# Patient Record
Sex: MALE | Race: WHITE | URBAN - METROPOLITAN AREA
[De-identification: names, ages, dates, MRNs, and addresses within clinical notes are randomized per-mention and may not be internally consistent; named-entity substitution may affect disease eponyms.]

---

## 2023-10-30 ENCOUNTER — APPOINTMENT (OUTPATIENT)
Dept: URBAN - METROPOLITAN AREA CLINIC 224 | Age: 63
Setting detail: DERMATOLOGY
End: 2023-11-02

## 2023-10-30 DIAGNOSIS — L30.9 DERMATITIS, UNSPECIFIED: ICD-10-CM

## 2023-10-30 PROCEDURE — OTHER ORDER TESTS: OTHER

## 2023-10-30 PROCEDURE — OTHER COUNSELING: OTHER

## 2023-10-30 PROCEDURE — OTHER TREATMENT REGIMEN: OTHER

## 2023-10-30 PROCEDURE — OTHER MIPS QUALITY: OTHER

## 2023-10-30 PROCEDURE — 99204 OFFICE O/P NEW MOD 45 MIN: CPT

## 2023-10-30 PROCEDURE — OTHER PRESCRIPTION: OTHER

## 2023-10-30 RX ORDER — CEPHALEXIN 500 MG/1
CAPSULE ORAL
Qty: 20 | Refills: 0 | Status: ERX | COMMUNITY
Start: 2023-10-30

## 2023-10-30 RX ORDER — TRIAMCINOLONE ACETONIDE 1 MG/G
CREAM TOPICAL
Qty: 80 | Refills: 0 | Status: ERX | COMMUNITY
Start: 2023-10-30

## 2023-10-30 ASSESSMENT — LOCATION SIMPLE DESCRIPTION DERM: LOCATION SIMPLE: LEFT PRETIBIAL REGION

## 2023-10-30 ASSESSMENT — LOCATION DETAILED DESCRIPTION DERM: LOCATION DETAILED: LEFT PROXIMAL PRETIBIAL REGION

## 2023-10-30 ASSESSMENT — LOCATION ZONE DERM: LOCATION ZONE: LEG

## 2023-10-30 NOTE — PROCEDURE: TREATMENT REGIMEN
Detail Level: Zone
Plan: Follow up in 2 weeks.
Otc Regimen: Compression stockings.
Initiate Treatment: :\\n1. Cephalexin 500 mg capsule PO Sig: Take 1 capsule by mouth twice daily x10 days. Advised to take a probiotic.\\n2. Triamcinolone acetonide 0.1 % topical cream TP Sig: Apply a thin layer to affected area of left leg twice daily x2 weeks, then follow up in clinic as directed.

## 2023-11-03 ENCOUNTER — RX ONLY (RX ONLY)
Age: 63
End: 2023-11-03

## 2023-11-03 RX ORDER — DOXYCYCLINE HYCLATE 100 MG/1
TABLET, COATED ORAL
Qty: 28 | Refills: 0 | Status: ERX | COMMUNITY
Start: 2023-11-03

## 2023-11-13 ENCOUNTER — APPOINTMENT (OUTPATIENT)
Dept: URBAN - METROPOLITAN AREA CLINIC 224 | Age: 63
Setting detail: DERMATOLOGY
End: 2023-11-20

## 2023-11-13 DIAGNOSIS — B95.61 METHICILLIN SUSCEPTIBLE STAPHYLOCOCCUS AUREUS INFECTION AS THE CAUSE OF DISEASES CLASSIFIED ELSEWHERE: ICD-10-CM

## 2023-11-13 DIAGNOSIS — L0391 CELLULITIS AND ABSCESS OF UNSPECIFIED SITES: ICD-10-CM

## 2023-11-13 DIAGNOSIS — L81.0 POSTINFLAMMATORY HYPERPIGMENTATION: ICD-10-CM

## 2023-11-13 DIAGNOSIS — L57.8 OTHER SKIN CHANGES DUE TO CHRONIC EXPOSURE TO NONIONIZING RADIATION: ICD-10-CM

## 2023-11-13 DIAGNOSIS — L0390 CELLULITIS AND ABSCESS OF UNSPECIFIED SITES: ICD-10-CM

## 2023-11-13 PROBLEM — L03.116 CELLULITIS OF LEFT LOWER LIMB: Status: ACTIVE | Noted: 2023-11-13

## 2023-11-13 PROCEDURE — OTHER MIPS QUALITY: OTHER

## 2023-11-13 PROCEDURE — OTHER COUNSELING: OTHER

## 2023-11-13 PROCEDURE — OTHER TREATMENT REGIMEN: OTHER

## 2023-11-13 PROCEDURE — 99213 OFFICE O/P EST LOW 20 MIN: CPT

## 2023-11-13 ASSESSMENT — LOCATION DETAILED DESCRIPTION DERM
LOCATION DETAILED: LEFT DISTAL PRETIBIAL REGION
LOCATION DETAILED: LEFT PROXIMAL PRETIBIAL REGION
LOCATION DETAILED: LEFT INFERIOR CENTRAL MALAR CHEEK

## 2023-11-13 ASSESSMENT — LOCATION ZONE DERM
LOCATION ZONE: FACE
LOCATION ZONE: LEG

## 2023-11-13 ASSESSMENT — LOCATION SIMPLE DESCRIPTION DERM
LOCATION SIMPLE: LEFT PRETIBIAL REGION
LOCATION SIMPLE: LEFT CHEEK

## 2023-11-13 NOTE — PROCEDURE: TREATMENT REGIMEN
Continue Regimen: :\\n1. Doxycycline until completed
Detail Level: Zone
Plan: Compression stockings daily.\\nFollow up as needed if no improvement or recurs.
Continue Regimen: :\\n1. Doxycycline until completed.\\n2. Triamcinolone 0.1% cream once daily x2 weeks then discontinue. \\n3. Compression stockings daily.
Plan: Follow up as needed if no improvement or recurs.

## 2023-12-14 ENCOUNTER — APPOINTMENT (OUTPATIENT)
Dept: URBAN - METROPOLITAN AREA CLINIC 224 | Age: 63
Setting detail: DERMATOLOGY
End: 2023-12-15

## 2023-12-14 DIAGNOSIS — L0390 CELLULITIS AND ABSCESS OF UNSPECIFIED SITES: ICD-10-CM

## 2023-12-14 DIAGNOSIS — L0391 CELLULITIS AND ABSCESS OF UNSPECIFIED SITES: ICD-10-CM

## 2023-12-14 DIAGNOSIS — L81.0 POSTINFLAMMATORY HYPERPIGMENTATION: ICD-10-CM

## 2023-12-14 DIAGNOSIS — B95.61 METHICILLIN SUSCEPTIBLE STAPHYLOCOCCUS AUREUS INFECTION AS THE CAUSE OF DISEASES CLASSIFIED ELSEWHERE: ICD-10-CM

## 2023-12-14 PROBLEM — L03.116 CELLULITIS OF LEFT LOWER LIMB: Status: ACTIVE | Noted: 2023-12-14

## 2023-12-14 PROCEDURE — 99214 OFFICE O/P EST MOD 30 MIN: CPT

## 2023-12-14 PROCEDURE — OTHER TREATMENT REGIMEN: OTHER

## 2023-12-14 PROCEDURE — OTHER COUNSELING: OTHER

## 2023-12-14 PROCEDURE — OTHER MIPS QUALITY: OTHER

## 2023-12-14 PROCEDURE — OTHER PRESCRIPTION: OTHER

## 2023-12-14 RX ORDER — DOXYCYCLINE HYCLATE 100 MG/1
TABLET, COATED ORAL
Qty: 60 | Refills: 0 | Status: ERX

## 2023-12-14 ASSESSMENT — LOCATION DETAILED DESCRIPTION DERM
LOCATION DETAILED: LEFT DISTAL PRETIBIAL REGION
LOCATION DETAILED: LEFT PROXIMAL PRETIBIAL REGION

## 2023-12-14 ASSESSMENT — LOCATION ZONE DERM: LOCATION ZONE: LEG

## 2023-12-14 ASSESSMENT — LOCATION SIMPLE DESCRIPTION DERM: LOCATION SIMPLE: LEFT PRETIBIAL REGION

## 2023-12-14 NOTE — PROCEDURE: TREATMENT REGIMEN
Initiate Treatment: :\\n1. Doxycycline hyclate 100 mg tablet: Take 1 capsule by mouth twice daily x 30 days.  Take a probiotic in between doses.
Detail Level: Zone
Plan: Compression stockings daily.\\nFollow up x 2 weeks.

## 2024-01-04 ENCOUNTER — APPOINTMENT (OUTPATIENT)
Dept: URBAN - METROPOLITAN AREA CLINIC 224 | Age: 64
Setting detail: DERMATOLOGY
End: 2024-01-04

## 2024-01-04 DIAGNOSIS — L0391 CELLULITIS AND ABSCESS OF UNSPECIFIED SITES: ICD-10-CM

## 2024-01-04 DIAGNOSIS — B95.61 METHICILLIN SUSCEPTIBLE STAPHYLOCOCCUS AUREUS INFECTION AS THE CAUSE OF DISEASES CLASSIFIED ELSEWHERE: ICD-10-CM

## 2024-01-04 DIAGNOSIS — L81.0 POSTINFLAMMATORY HYPERPIGMENTATION: ICD-10-CM

## 2024-01-04 DIAGNOSIS — L0390 CELLULITIS AND ABSCESS OF UNSPECIFIED SITES: ICD-10-CM

## 2024-01-04 PROBLEM — L03.116 CELLULITIS OF LEFT LOWER LIMB: Status: ACTIVE | Noted: 2024-01-04

## 2024-01-04 PROCEDURE — OTHER COUNSELING: OTHER

## 2024-01-04 PROCEDURE — OTHER MIPS QUALITY: OTHER

## 2024-01-04 PROCEDURE — OTHER PRESCRIPTION: OTHER

## 2024-01-04 PROCEDURE — 99214 OFFICE O/P EST MOD 30 MIN: CPT

## 2024-01-04 PROCEDURE — OTHER TREATMENT REGIMEN: OTHER

## 2024-01-04 RX ORDER — SULFAMETHOXAZOLE AND TRIMETHOPRIM 800; 160 MG/1; MG/1
TABLET ORAL BID
Qty: 20 | Refills: 3 | Status: ERX | COMMUNITY
Start: 2024-01-04

## 2024-01-04 ASSESSMENT — LOCATION SIMPLE DESCRIPTION DERM: LOCATION SIMPLE: LEFT PRETIBIAL REGION

## 2024-01-04 ASSESSMENT — LOCATION DETAILED DESCRIPTION DERM
LOCATION DETAILED: LEFT PROXIMAL PRETIBIAL REGION
LOCATION DETAILED: LEFT DISTAL PRETIBIAL REGION

## 2024-01-04 ASSESSMENT — LOCATION ZONE DERM: LOCATION ZONE: LEG

## 2024-01-04 NOTE — PROCEDURE: TREATMENT REGIMEN
Initiate Treatment: Bactrim  mg-160 mg tablet BID Take one pill twice daily for ten days .
Discontinue Regimen: Doxycycline hyclate 100 mg tablet: Take 1 capsule by mouth twice daily x 30 days.  Take a probiotic in between doses.
Continue Regimen: Triamcinolone cream at bedtime
Detail Level: Zone
Plan: Compression stockings daily.\\nFollow up x 2 weeks.
Initiate Treatment: Bactrim  mg-160 mg tablet PO Frequency: BID Sig: Take one tablet by mouth twice daily for ten days.
Plan: Follow up as scheduled for FBSE and skin infection.

## 2024-01-15 ENCOUNTER — APPOINTMENT (OUTPATIENT)
Dept: URBAN - METROPOLITAN AREA CLINIC 224 | Age: 64
Setting detail: DERMATOLOGY
End: 2024-01-15

## 2024-01-15 DIAGNOSIS — D18.0 HEMANGIOMA: ICD-10-CM

## 2024-01-15 DIAGNOSIS — L0391 CELLULITIS AND ABSCESS OF UNSPECIFIED SITES: ICD-10-CM

## 2024-01-15 DIAGNOSIS — L81.4 OTHER MELANIN HYPERPIGMENTATION: ICD-10-CM

## 2024-01-15 DIAGNOSIS — B95.61 METHICILLIN SUSCEPTIBLE STAPHYLOCOCCUS AUREUS INFECTION AS THE CAUSE OF DISEASES CLASSIFIED ELSEWHERE: ICD-10-CM

## 2024-01-15 DIAGNOSIS — D22 MELANOCYTIC NEVI: ICD-10-CM

## 2024-01-15 DIAGNOSIS — L82.1 OTHER SEBORRHEIC KERATOSIS: ICD-10-CM

## 2024-01-15 DIAGNOSIS — Z71.89 OTHER SPECIFIED COUNSELING: ICD-10-CM

## 2024-01-15 DIAGNOSIS — D17 BENIGN LIPOMATOUS NEOPLASM: ICD-10-CM

## 2024-01-15 DIAGNOSIS — L81.0 POSTINFLAMMATORY HYPERPIGMENTATION: ICD-10-CM

## 2024-01-15 DIAGNOSIS — L81.5 LEUKODERMA, NOT ELSEWHERE CLASSIFIED: ICD-10-CM

## 2024-01-15 DIAGNOSIS — L0390 CELLULITIS AND ABSCESS OF UNSPECIFIED SITES: ICD-10-CM

## 2024-01-15 DIAGNOSIS — L91.8 OTHER HYPERTROPHIC DISORDERS OF THE SKIN: ICD-10-CM

## 2024-01-15 DIAGNOSIS — L57.8 OTHER SKIN CHANGES DUE TO CHRONIC EXPOSURE TO NONIONIZING RADIATION: ICD-10-CM

## 2024-01-15 PROBLEM — D22.72 MELANOCYTIC NEVI OF LEFT LOWER LIMB, INCLUDING HIP: Status: ACTIVE | Noted: 2024-01-15

## 2024-01-15 PROBLEM — D17.21 BENIGN LIPOMATOUS NEOPLASM OF SKIN AND SUBCUTANEOUS TISSUE OF RIGHT ARM: Status: ACTIVE | Noted: 2024-01-15

## 2024-01-15 PROBLEM — D22.71 MELANOCYTIC NEVI OF RIGHT LOWER LIMB, INCLUDING HIP: Status: ACTIVE | Noted: 2024-01-15

## 2024-01-15 PROBLEM — D22.61 MELANOCYTIC NEVI OF RIGHT UPPER LIMB, INCLUDING SHOULDER: Status: ACTIVE | Noted: 2024-01-15

## 2024-01-15 PROBLEM — L03.116 CELLULITIS OF LEFT LOWER LIMB: Status: ACTIVE | Noted: 2024-01-15

## 2024-01-15 PROBLEM — D17.22 BENIGN LIPOMATOUS NEOPLASM OF SKIN AND SUBCUTANEOUS TISSUE OF LEFT ARM: Status: ACTIVE | Noted: 2024-01-15

## 2024-01-15 PROBLEM — D18.01 HEMANGIOMA OF SKIN AND SUBCUTANEOUS TISSUE: Status: ACTIVE | Noted: 2024-01-15

## 2024-01-15 PROBLEM — D22.62 MELANOCYTIC NEVI OF LEFT UPPER LIMB, INCLUDING SHOULDER: Status: ACTIVE | Noted: 2024-01-15

## 2024-01-15 PROCEDURE — OTHER OBSERVATION: OTHER

## 2024-01-15 PROCEDURE — OTHER TREATMENT REGIMEN: OTHER

## 2024-01-15 PROCEDURE — OTHER PRESCRIPTION: OTHER

## 2024-01-15 PROCEDURE — OTHER OBSERVATION AND MEASURE: OTHER

## 2024-01-15 PROCEDURE — 99214 OFFICE O/P EST MOD 30 MIN: CPT

## 2024-01-15 PROCEDURE — OTHER COUNSELING: OTHER

## 2024-01-15 PROCEDURE — OTHER REASSURANCE: OTHER

## 2024-01-15 PROCEDURE — OTHER SUNSCREEN RECOMMENDATIONS: OTHER

## 2024-01-15 PROCEDURE — OTHER MIPS QUALITY: OTHER

## 2024-01-15 RX ORDER — SULFAMETHOXAZOLE AND TRIMETHOPRIM 800; 160 MG/1; MG/1
TABLET ORAL BID
Qty: 20 | Refills: 0 | Status: ERX

## 2024-01-15 ASSESSMENT — LOCATION ZONE DERM
LOCATION ZONE: FACE
LOCATION ZONE: LEG
LOCATION ZONE: TRUNK
LOCATION ZONE: ARM

## 2024-01-15 ASSESSMENT — LOCATION SIMPLE DESCRIPTION DERM
LOCATION SIMPLE: RIGHT POSTERIOR THIGH
LOCATION SIMPLE: RIGHT FOREARM
LOCATION SIMPLE: LEFT CALF
LOCATION SIMPLE: LEFT UPPER BACK
LOCATION SIMPLE: RIGHT PRETIBIAL REGION
LOCATION SIMPLE: UPPER BACK
LOCATION SIMPLE: RIGHT CHEEK
LOCATION SIMPLE: RIGHT CALF
LOCATION SIMPLE: LEFT POSTERIOR THIGH
LOCATION SIMPLE: LEFT THIGH
LOCATION SIMPLE: LEFT CHEEK
LOCATION SIMPLE: LEFT WRIST
LOCATION SIMPLE: LEFT FOREARM
LOCATION SIMPLE: LEFT PRETIBIAL REGION
LOCATION SIMPLE: RIGHT THIGH

## 2024-01-15 ASSESSMENT — LOCATION DETAILED DESCRIPTION DERM
LOCATION DETAILED: RIGHT ANTERIOR DISTAL THIGH
LOCATION DETAILED: RIGHT PROXIMAL PRETIBIAL REGION
LOCATION DETAILED: INFERIOR THORACIC SPINE
LOCATION DETAILED: LEFT INFERIOR CENTRAL MALAR CHEEK
LOCATION DETAILED: RIGHT DISTAL PRETIBIAL REGION
LOCATION DETAILED: LEFT PROXIMAL POSTERIOR THIGH
LOCATION DETAILED: LEFT SUPERIOR CENTRAL MALAR CHEEK
LOCATION DETAILED: LEFT LATERAL DORSAL WRIST
LOCATION DETAILED: LEFT SUPERIOR MEDIAL UPPER BACK
LOCATION DETAILED: LEFT DISTAL DORSAL FOREARM
LOCATION DETAILED: LEFT PROXIMAL DORSAL FOREARM
LOCATION DETAILED: RIGHT DISTAL POSTERIOR THIGH
LOCATION DETAILED: LEFT PROXIMAL PRETIBIAL REGION
LOCATION DETAILED: LEFT PROXIMAL CALF
LOCATION DETAILED: LEFT DISTAL PRETIBIAL REGION
LOCATION DETAILED: RIGHT SUPERIOR LATERAL BUCCAL CHEEK
LOCATION DETAILED: RIGHT DISTAL CALF
LOCATION DETAILED: RIGHT DISTAL DORSAL FOREARM
LOCATION DETAILED: LEFT ANTERIOR DISTAL THIGH
LOCATION DETAILED: RIGHT PROXIMAL DORSAL FOREARM
LOCATION DETAILED: RIGHT INFERIOR CENTRAL MALAR CHEEK
LOCATION DETAILED: LEFT INFERIOR MEDIAL MALAR CHEEK

## 2024-01-15 NOTE — PROCEDURE: TREATMENT REGIMEN
Continue Regimen: Triamcinolone cream at bedtime\\nBactrim  mg-160 mg tablet BID Take one pill twice daily for ten days .
Detail Level: Zone
Plan: Compression stockings daily.\\nFollow up x 2 weeks.
Initiate Treatment: :\\nBactrim  mg-160 mg tablet: Take one tablet by mouth twice daily for ten days.
Plan: Follow up 10 days Staph infection.

## 2024-01-29 ENCOUNTER — APPOINTMENT (OUTPATIENT)
Dept: URBAN - METROPOLITAN AREA CLINIC 224 | Age: 64
Setting detail: DERMATOLOGY
End: 2024-01-30

## 2024-01-29 DIAGNOSIS — B95.61 METHICILLIN SUSCEPTIBLE STAPHYLOCOCCUS AUREUS INFECTION AS THE CAUSE OF DISEASES CLASSIFIED ELSEWHERE: ICD-10-CM

## 2024-01-29 DIAGNOSIS — L0390 CELLULITIS AND ABSCESS OF UNSPECIFIED SITES: ICD-10-CM

## 2024-01-29 DIAGNOSIS — L81.0 POSTINFLAMMATORY HYPERPIGMENTATION: ICD-10-CM

## 2024-01-29 DIAGNOSIS — L91.8 OTHER HYPERTROPHIC DISORDERS OF THE SKIN: ICD-10-CM

## 2024-01-29 DIAGNOSIS — L0391 CELLULITIS AND ABSCESS OF UNSPECIFIED SITES: ICD-10-CM

## 2024-01-29 PROBLEM — L03.116 CELLULITIS OF LEFT LOWER LIMB: Status: ACTIVE | Noted: 2024-01-29

## 2024-01-29 PROCEDURE — OTHER OBSERVATION AND MEASURE: OTHER

## 2024-01-29 PROCEDURE — 99214 OFFICE O/P EST MOD 30 MIN: CPT

## 2024-01-29 PROCEDURE — OTHER COUNSELING: OTHER

## 2024-01-29 PROCEDURE — OTHER TREATMENT REGIMEN: OTHER

## 2024-01-29 PROCEDURE — OTHER MIPS QUALITY: OTHER

## 2024-01-29 PROCEDURE — OTHER OBSERVATION: OTHER

## 2024-01-29 PROCEDURE — OTHER SKIN TAG REMOVAL (COSMETIC): OTHER

## 2024-01-29 PROCEDURE — OTHER PRESCRIPTION: OTHER

## 2024-01-29 RX ORDER — SULFAMETHOXAZOLE AND TRIMETHOPRIM 800; 160 MG/1; MG/1
TABLET ORAL BID
Qty: 20 | Refills: 0 | Status: ERX

## 2024-01-29 ASSESSMENT — LOCATION ZONE DERM
LOCATION ZONE: FACE
LOCATION ZONE: LEG
LOCATION ZONE: NECK
LOCATION ZONE: EYELID

## 2024-01-29 ASSESSMENT — LOCATION SIMPLE DESCRIPTION DERM
LOCATION SIMPLE: LEFT PRETIBIAL REGION
LOCATION SIMPLE: NECK
LOCATION SIMPLE: LEFT EYELID
LOCATION SIMPLE: RIGHT CHEEK
LOCATION SIMPLE: LEFT CHEEK
LOCATION SIMPLE: RIGHT INFERIOR EYELID

## 2024-01-29 ASSESSMENT — LOCATION DETAILED DESCRIPTION DERM
LOCATION DETAILED: RIGHT SUPERIOR CENTRAL MALAR CHEEK
LOCATION DETAILED: LEFT LATERAL CANTHUS
LOCATION DETAILED: RIGHT LATERAL INFERIOR EYELID
LOCATION DETAILED: LEFT PROXIMAL PRETIBIAL REGION
LOCATION DETAILED: RIGHT CENTRAL LATERAL NECK
LOCATION DETAILED: LEFT SUPERIOR CENTRAL MALAR CHEEK
LOCATION DETAILED: LEFT DISTAL PRETIBIAL REGION

## 2024-01-29 NOTE — PROCEDURE: TREATMENT REGIMEN
Discontinue Regimen: Triamcinolone cream at bedtime
Continue Regimen: Bactrim  mg-160 mg tablet BID Take one pill twice daily for ten days .\\nCeraVe anti itch cream as needed.
Detail Level: Zone
Plan: Compression stockings daily.\\nFollow up as needed for flare/ recurrence.

## 2024-01-29 NOTE — PROCEDURE: SKIN TAG REMOVAL (COSMETIC)
Removed With: scissors
Price (Use Numbers Only, No Special Characters Or $): 100
Consent: Written consent obtained and the risks of skin tag removal was reviewed with the patient including but not limited to bleeding, pigmentary change, infection, pain, and remote possibility of scarring.
Anesthesia Volume In Cc: 3
Detail Level: Detailed
Anesthesia Type: 1% lidocaine with epinephrine

## 2025-03-13 ENCOUNTER — APPOINTMENT (OUTPATIENT)
Dept: URBAN - METROPOLITAN AREA CLINIC 224 | Age: 65
Setting detail: DERMATOLOGY
End: 2025-03-23

## 2025-03-13 DIAGNOSIS — B35.6 TINEA CRURIS: ICD-10-CM

## 2025-03-13 PROCEDURE — OTHER MIPS QUALITY: OTHER

## 2025-03-13 PROCEDURE — OTHER PRESCRIPTION: OTHER

## 2025-03-13 PROCEDURE — OTHER TREATMENT REGIMEN: OTHER

## 2025-03-13 PROCEDURE — 99214 OFFICE O/P EST MOD 30 MIN: CPT

## 2025-03-13 PROCEDURE — OTHER COUNSELING: OTHER

## 2025-03-13 ASSESSMENT — LOCATION DETAILED DESCRIPTION DERM
LOCATION DETAILED: LEFT INGUINAL CREASE
LOCATION DETAILED: LEFT MEDIAL POSTERIOR THIGH
LOCATION DETAILED: RIGHT MEDIAL POSTERIOR THIGH
LOCATION DETAILED: RIGHT INGUINAL CREASE

## 2025-03-13 ASSESSMENT — LOCATION SIMPLE DESCRIPTION DERM
LOCATION SIMPLE: LEFT INGUINAL CREASE
LOCATION SIMPLE: RIGHT THIGH
LOCATION SIMPLE: LEFT THIGH
LOCATION SIMPLE: RIGHT INGUINAL CREASE

## 2025-03-13 ASSESSMENT — LOCATION ZONE DERM
LOCATION ZONE: LEG
LOCATION ZONE: TRUNK

## 2025-03-13 ASSESSMENT — SEVERITY ASSESSMENT: SEVERITY: MODERATE

## 2025-03-13 NOTE — PROCEDURE: TREATMENT REGIMEN
Plan: Follow up x 2 weeks Rash/FBSE.
Initiate Treatment: :\\n1. Terconazole 0.4 % vaginal cream: Apply thin layer to affected areas of groin twice daily for x 1 week then decrease to once daily x 1 week. Follow up in clinic as directed.
Detail Level: Zone

## 2025-04-14 ENCOUNTER — APPOINTMENT (OUTPATIENT)
Dept: URBAN - METROPOLITAN AREA CLINIC 224 | Age: 65
Setting detail: DERMATOLOGY
End: 2025-04-15

## 2025-04-14 DIAGNOSIS — D49.2 NEOPLASM OF UNSPECIFIED BEHAVIOR OF BONE, SOFT TISSUE, AND SKIN: ICD-10-CM

## 2025-04-14 DIAGNOSIS — L81.5 LEUKODERMA, NOT ELSEWHERE CLASSIFIED: ICD-10-CM

## 2025-04-14 DIAGNOSIS — L82.0 INFLAMED SEBORRHEIC KERATOSIS: ICD-10-CM

## 2025-04-14 DIAGNOSIS — D18.0 HEMANGIOMA: ICD-10-CM

## 2025-04-14 DIAGNOSIS — D22 MELANOCYTIC NEVI: ICD-10-CM

## 2025-04-14 DIAGNOSIS — L57.8 OTHER SKIN CHANGES DUE TO CHRONIC EXPOSURE TO NONIONIZING RADIATION: ICD-10-CM

## 2025-04-14 DIAGNOSIS — L81.4 OTHER MELANIN HYPERPIGMENTATION: ICD-10-CM

## 2025-04-14 DIAGNOSIS — B35.6 TINEA CRURIS: ICD-10-CM

## 2025-04-14 DIAGNOSIS — Z71.89 OTHER SPECIFIED COUNSELING: ICD-10-CM

## 2025-04-14 DIAGNOSIS — L82.1 OTHER SEBORRHEIC KERATOSIS: ICD-10-CM

## 2025-04-14 PROBLEM — D22.9 MELANOCYTIC NEVI, UNSPECIFIED: Status: ACTIVE | Noted: 2025-04-14

## 2025-04-14 PROBLEM — D22.62 MELANOCYTIC NEVI OF LEFT UPPER LIMB, INCLUDING SHOULDER: Status: ACTIVE | Noted: 2025-04-14

## 2025-04-14 PROBLEM — D22.61 MELANOCYTIC NEVI OF RIGHT UPPER LIMB, INCLUDING SHOULDER: Status: ACTIVE | Noted: 2025-04-14

## 2025-04-14 PROBLEM — D22.5 MELANOCYTIC NEVI OF TRUNK: Status: ACTIVE | Noted: 2025-04-14

## 2025-04-14 PROBLEM — D22.72 MELANOCYTIC NEVI OF LEFT LOWER LIMB, INCLUDING HIP: Status: ACTIVE | Noted: 2025-04-14

## 2025-04-14 PROBLEM — D18.01 HEMANGIOMA OF SKIN AND SUBCUTANEOUS TISSUE: Status: ACTIVE | Noted: 2025-04-14

## 2025-04-14 PROBLEM — D22.71 MELANOCYTIC NEVI OF RIGHT LOWER LIMB, INCLUDING HIP: Status: ACTIVE | Noted: 2025-04-14

## 2025-04-14 PROCEDURE — OTHER LIQUID NITROGEN: OTHER

## 2025-04-14 PROCEDURE — 17110 DESTRUCT B9 LESION 1-14: CPT

## 2025-04-14 PROCEDURE — 99214 OFFICE O/P EST MOD 30 MIN: CPT | Mod: 25

## 2025-04-14 PROCEDURE — 11103 TANGNTL BX SKIN EA SEP/ADDL: CPT | Mod: 59

## 2025-04-14 PROCEDURE — OTHER COUNSELING: OTHER

## 2025-04-14 PROCEDURE — OTHER PRESCRIPTION: OTHER

## 2025-04-14 PROCEDURE — OTHER BIOPSY BY SHAVE METHOD: OTHER

## 2025-04-14 PROCEDURE — OTHER MIPS QUALITY: OTHER

## 2025-04-14 PROCEDURE — 11102 TANGNTL BX SKIN SINGLE LES: CPT | Mod: 59

## 2025-04-14 PROCEDURE — OTHER TREATMENT REGIMEN: OTHER

## 2025-04-14 RX ORDER — KETOCONAZOLE 20 MG/ML
SHAMPOO, SUSPENSION TOPICAL
Qty: 120 | Refills: 2 | Status: ERX | COMMUNITY
Start: 2025-04-14

## 2025-04-14 RX ORDER — TERCONAZOLE 4 MG/G
CREAM VAGINAL
Qty: 45 | Refills: 1 | Status: ERX

## 2025-04-14 ASSESSMENT — LOCATION DETAILED DESCRIPTION DERM
LOCATION DETAILED: EPIGASTRIC SKIN
LOCATION DETAILED: LEFT PROXIMAL DORSAL FOREARM
LOCATION DETAILED: LEFT DISTAL DORSAL FOREARM
LOCATION DETAILED: LEFT ARCH
LOCATION DETAILED: RIGHT DISTAL DORSAL FOREARM
LOCATION DETAILED: LEFT MEDIAL UPPER BACK
LOCATION DETAILED: LEFT MEDIAL POSTERIOR THIGH
LOCATION DETAILED: LEFT MID-UPPER BACK
LOCATION DETAILED: RIGHT MEDIAL UPPER BACK
LOCATION DETAILED: RIGHT INGUINAL CREASE
LOCATION DETAILED: LEFT CENTRAL MALAR CHEEK
LOCATION DETAILED: LEFT PROXIMAL PRETIBIAL REGION
LOCATION DETAILED: LEFT SUPERIOR MEDIAL UPPER BACK
LOCATION DETAILED: LEFT PLANTAR FOREFOOT OVERLYING 4TH METATARSAL
LOCATION DETAILED: LEFT INGUINAL CREASE
LOCATION DETAILED: RIGHT AXILLARY VAULT
LOCATION DETAILED: RIGHT ANTERIOR DISTAL THIGH
LOCATION DETAILED: LEFT ANTERIOR PROXIMAL THIGH
LOCATION DETAILED: RIGHT MEDIAL POSTERIOR THIGH
LOCATION DETAILED: RIGHT ANTERIOR DISTAL UPPER ARM
LOCATION DETAILED: RIGHT MEDIAL INFERIOR CHEST
LOCATION DETAILED: RIGHT CENTRAL MALAR CHEEK
LOCATION DETAILED: RIGHT DISTAL PRETIBIAL REGION
LOCATION DETAILED: RIGHT SUPERIOR MEDIAL UPPER BACK
LOCATION DETAILED: RIGHT PROXIMAL DORSAL FOREARM
LOCATION DETAILED: PERIUMBILICAL SKIN
LOCATION DETAILED: LEFT ANTERIOR DISTAL UPPER ARM
LOCATION DETAILED: INFERIOR THORACIC SPINE
LOCATION DETAILED: STERNUM
LOCATION DETAILED: LEFT DISTAL POSTERIOR UPPER ARM
LOCATION DETAILED: RIGHT PROXIMAL POSTERIOR UPPER ARM

## 2025-04-14 ASSESSMENT — LOCATION SIMPLE DESCRIPTION DERM
LOCATION SIMPLE: RIGHT AXILLARY VAULT
LOCATION SIMPLE: RIGHT PRETIBIAL REGION
LOCATION SIMPLE: RIGHT UPPER BACK
LOCATION SIMPLE: RIGHT INGUINAL CREASE
LOCATION SIMPLE: LEFT PRETIBIAL REGION
LOCATION SIMPLE: RIGHT UPPER ARM
LOCATION SIMPLE: LEFT FOREARM
LOCATION SIMPLE: LEFT INGUINAL CREASE
LOCATION SIMPLE: LEFT PLANTAR SURFACE
LOCATION SIMPLE: UPPER BACK
LOCATION SIMPLE: RIGHT CHEEK
LOCATION SIMPLE: LEFT CHEEK
LOCATION SIMPLE: LEFT UPPER BACK
LOCATION SIMPLE: LEFT THIGH
LOCATION SIMPLE: LEFT UPPER ARM
LOCATION SIMPLE: ABDOMEN
LOCATION SIMPLE: RIGHT THIGH
LOCATION SIMPLE: RIGHT FOREARM
LOCATION SIMPLE: CHEST

## 2025-04-14 ASSESSMENT — LOCATION ZONE DERM
LOCATION ZONE: LEG
LOCATION ZONE: FACE
LOCATION ZONE: FEET
LOCATION ZONE: TRUNK
LOCATION ZONE: AXILLAE
LOCATION ZONE: ARM

## 2025-04-14 ASSESSMENT — SEVERITY ASSESSMENT: SEVERITY: CLEAR

## 2025-04-14 NOTE — PROCEDURE: TREATMENT REGIMEN
Plan: :\\n1. Recommend Lume soap once every other day alternating with Ketoconazole shampoo.\\n2. Use Gold Bond powder or InterDry Ag sheets as needed to help with moisture.\\n3. Follow up if no improvement/worsens or with flares.
Modify Regimen: :\\n1. Terconazole 0.4 % vaginal cream: Apply thin layer to affected areas of groin twice daily for x 1 week then decrease to once daily x 1 week then discontinue. Repeat ONLY as needed for SEVERE flares.
Initiate Treatment: :\\n1. Ketoconazole 2 % shampoo: Use to wash affected areas of groin once every other day as needed for flares. Apply, lather, let sit for 2-5 mins then rinse. May alternate with Lume soap.
Detail Level: Zone

## 2025-04-14 NOTE — PROCEDURE: BIOPSY BY SHAVE METHOD
Detail Level: Detailed
Depth Of Biopsy: dermis
Was A Bandage Applied: Yes
Size Of Lesion In Cm: 0.7
X Size Of Lesion In Cm: 0
Biopsy Type: H and E
Biopsy Method: Dermablade
Anesthesia Type: 1% lidocaine with epinephrine
Anesthesia Volume In Cc: 0.5
Hemostasis: Drysol
Wound Care: Petrolatum
Dressing: bandage
Destruction After The Procedure: No
Type Of Destruction Used: Curettage
Curettage Text: The wound bed was treated with curettage after the biopsy was performed.
Cryotherapy Text: The wound bed was treated with cryotherapy after the biopsy was performed.
Electrodesiccation Text: The wound bed was treated with electrodesiccation after the biopsy was performed.
Electrodesiccation And Curettage Text: The wound bed was treated with electrodesiccation and curettage after the biopsy was performed.
Silver Nitrate Text: The wound bed was treated with silver nitrate after the biopsy was performed.
Lab: [id number]
Path Notes (To The Dermatopathologist): Dr. Mosquera only.
Medical Necessity Information: It is in your best interest to select a reason for this procedure from the list below. All of these items fulfill various CMS LCD requirements except the new and changing color options.
Consent: Written consent was obtained and risks were reviewed including but not limited to scarring, infection, bleeding, scabbing, incomplete removal, nerve damage and allergy to anesthesia.
Post-Care Instructions: I reviewed with the patient in detail post-care instructions. Patient is to keep the biopsy site dry overnight, and then apply bacitracin twice daily until healed. Patient may apply hydrogen peroxide soaks to remove any crusting.
Notification Instructions: Patient will be notified of biopsy results. However, patient instructed to call the office if not contacted within 2 weeks.
Billing Type: Third-Party Bill
Information: Selecting Yes will display possible errors in your note based on the variables you have selected. This validation is only offered as a suggestion for you. PLEASE NOTE THAT THE VALIDATION TEXT WILL BE REMOVED WHEN YOU FINALIZE YOUR NOTE. IF YOU WANT TO FAX A PRELIMINARY NOTE YOU WILL NEED TO TOGGLE THIS TO 'NO' IF YOU DO NOT WANT IT IN YOUR FAXED NOTE.
Size Of Lesion In Cm: 0.6
Path Notes (To The Dermatopathologist): 2 pieces\\nDr. Schowalter only

## 2025-04-14 NOTE — PROCEDURE: MIPS QUALITY
Quality 47: Advance Care Plan: Advance Care Planning discussed and documented in the medical record; patient did not wish or was not able to name a surrogate decision maker or provide an advance care plan.
Quality 226: Preventive Care And Screening: Tobacco Use: Screening And Cessation Intervention: Patient screened for tobacco use, is a smoker AND received Cessation Counseling within measurement period or in the six months prior to the measurement period
Detail Level: Detailed
Quality 486: Dermatitis - Improvement In Patient-Reported Itch Severity: Itch severity assessment score is reduced by 3 or more points from the initial (index) assessment score to the follow-up visit score
Quality 431: Preventive Care And Screening: Unhealthy Alcohol Use - Screening: Patient not identified as an unhealthy alcohol user when screened for unhealthy alcohol use using a systematic screening method